# Patient Record
Sex: MALE | URBAN - METROPOLITAN AREA
[De-identification: names, ages, dates, MRNs, and addresses within clinical notes are randomized per-mention and may not be internally consistent; named-entity substitution may affect disease eponyms.]

---

## 2020-02-13 ENCOUNTER — OFFICE VISIT (OUTPATIENT)
Dept: URBAN - METROPOLITAN AREA CLINIC 72 | Facility: CLINIC | Age: 30
End: 2020-02-13

## 2020-02-13 DIAGNOSIS — H52.13 MYOPIA, BILATERAL: Primary | ICD-10-CM

## 2020-02-13 PROCEDURE — 92014 COMPRE OPH EXAM EST PT 1/>: CPT | Performed by: OPTOMETRIST

## 2020-02-13 PROCEDURE — 92310 CONTACT LENS FITTING OU: CPT | Performed by: OPTOMETRIST

## 2020-02-13 ASSESSMENT — VISUAL ACUITY
OD: 20/20
OS: 20/20

## 2020-02-13 NOTE — IMPRESSION/PLAN
Impression: Myopia, bilateral: H52.13. Plan: OU: Discussed diagnosis in detail with patient. New glasses and contacts  Rx was given today. discussed the option of lasik. if patient wants to proceed will refer to [facility] for consult.

## 2023-07-03 ENCOUNTER — OFFICE VISIT (OUTPATIENT)
Dept: URBAN - METROPOLITAN AREA CLINIC 72 | Facility: CLINIC | Age: 33
End: 2023-07-03

## 2023-07-03 DIAGNOSIS — H52.13 MYOPIA, BILATERAL: Primary | ICD-10-CM

## 2023-07-03 PROCEDURE — 99202 OFFICE O/P NEW SF 15 MIN: CPT | Performed by: OPTOMETRIST

## 2023-07-03 PROCEDURE — 92310 CONTACT LENS FITTING OU: CPT | Performed by: OPTOMETRIST

## 2023-07-03 ASSESSMENT — INTRAOCULAR PRESSURE
OD: 20
OS: 20

## 2023-07-03 ASSESSMENT — VISUAL ACUITY
OD: 20/20
OS: 20/20

## 2023-07-03 NOTE — IMPRESSION/PLAN
Impression: Myopia, bilateral: H52.13. Plan: New spec Rx given - Discussed changes and possible adaptation period. CL trials given in clinic of [facility] Will order trials of Elle